# Patient Record
(demographics unavailable — no encounter records)

---

## 2024-10-25 NOTE — DEVELOPMENTAL MILESTONES
[Normal Development] : Normal Development [None] : none [Looks for hidden objects] : looks for hidden objects [Imitates new gestures] : imitates new gestures [Says "Dad" or "Mom" with meaning] : says "Dad" or "Mom" with meaning [Uses one word other than Mom or] : uses one word other than Mom or Dad or personal names [Follows a verbal command that] : follows a verbal command that includes a gesture [Takes first independent] : takes first independent steps [Stands without support] : stands without support [Picks up small object with 2 finger] : picks up small object with 2 finger pincer grasp [Picks up food and eats it] : picks up food and eats it

## 2024-10-25 NOTE — PHYSICAL EXAM
[Alert] : alert [Normocephalic] : normocephalic [Closed Anterior [place]] : closed anterior fontanelle [Red Reflex] : red reflex bilateral [PERRL] : PERRL [Normally Placed Ears] : normally placed ears [Auricles Well Formed] : auricles well formed [Clear Tympanic membranes] : clear tympanic membranes [Light reflex present] : light reflex present [Bony landmarks visible] : bony landmarks visible [Nares Patent] : nares patent [Palate Intact] : palate intact [Uvula Midline] : uvula midline [Tooth Eruption] : tooth eruption [Supple, full passive range of motion] : supple, full passive range of motion [Symmetric Chest Rise] : symmetric chest rise [Clear to Auscultation Bilaterally] : clear to auscultation bilaterally [Regular Rate and Rhythm] : regular rate and rhythm [S1, S2 present] : S1, S2 present [+2 Femoral Pulses] : (+) 2 femoral pulses [Soft] : soft [Bowel Sounds] : normoactive bowel sounds [Normal External Genitalia] : normal external genitalia [Normal Vaginal Introitus] : normal vaginal introitus [No Abnormal Lymph Nodes Palpated] : no abnormal lymph nodes palpated [Symmetric Abduction and Rotation of Hips] : symmetric abduction and rotation of hips [Straight] : straight [Cranial Nerves Grossly Intact] : cranial nerves grossly intact [Discharge] : no discharge [Palpable Masses] : no palpable masses [Murmurs] : no murmurs [Tender] : nontender [Distended] : nondistended [Hepatomegaly] : no hepatomegaly [Splenomegaly] : no splenomegaly [Clitoromegaly] : no clitoromegaly [Allis Sign] : negative Allis sign [Rash or Lesions] : no rash/lesions [de-identified] : very mild plagiocephaly to R side

## 2024-10-25 NOTE — DISCUSSION/SUMMARY
[FreeTextEntry1] : Transition to whole cow's milk. Continue table foods, 3 meals with 2-3 snacks per day. Incorporate up to 6 oz of flourinated water daily in a sippy cup. Brush teeth twice a day with soft toothbrush. Recommend visit to dentist. When in car, keep child in rear-facing car seats until age 2, or until  the maximum height and weight for seat is reached. Put baby to sleep in own crib with no loose or soft bedding. Lower crib matress. Help baby to maintain consistent daily routines and sleep schedule. Recognize stranger and separation anxiety. Ensure home is safe since baby is increasingly mobile. Be within arm's reach of baby at all times. Use consistent, positive discipline. Avoid screen time. Read aloud to baby. MMR, Prevnar and flu vaccines discussed. Mother will hold vaccines today d/t cyanotic episode. Will RTO for vaccines. Labs ordered Discussed breath holding episodes in detail with Mother. Provided reassurance and anticipatory guidance. Recommend f/u with cardiology and neurology, referrals provided. Follow up for 15 month [facility]

## 2024-10-25 NOTE — HISTORY OF PRESENT ILLNESS
[Breast milk] : breast milk [Table food] : table food [Wakes up at night] : Wakes up at night [NO] : No [Mother] : mother [Normal] : Normal [___ stools per day] : [unfilled]  stools per day [Vitamin] : Primary Fluoride Source: Vitamin [Playtime] : Playtime  [No] : Not at  exposure [Water heater temperature set at <120 degrees F] : Water heater temperature set at <120 degrees F [Car seat in back seat] : Car seat in back seat [Smoke Detectors] : Smoke detectors [Carbon Monoxide Detectors] : Carbon monoxide detectors [Up to date] : Up to date [In crib] : In crib [Sippy cup use] : Sippy cup use [Exposure to electronic nicotine delivery system] : No exposure to electronic nicotine delivery system [At risk for exposure to TB] : Not at risk for exposure to Tuberculosis [de-identified] : eating 3 meals daily, nursing on demand through out the day. introduced whole milk but not taking,  [FreeTextEntry3] : waking ON to nurse  [FreeTextEntry1] : Mother reports baby fell off bed about 3 feet high in August while visiting [place]. Pt fell and hit head on tile floor. She reports crying right away but also noticed the baby "turned blue" for a few seconds and seemed to stop breathing. Breathing started spontaneously again after a few seconds. Denies unresponsiveness or LOC. Denies any seizure like activity. Mother reports baby returned to normal behavior shortly after the episode. Did not follow up at doctor or hospital after episode as she was unfamiliar with hospitals in [place].   Reports a few other insistences where she noticed the baby "stops breathing" when she is worked up or crying but no further cyanotic episodes.  Also concerned for flattening to R side of head noticed a few months ago.

## 2024-12-21 NOTE — HISTORY OF PRESENT ILLNESS
[FreeTextEntry6] : Pt BIB mother for c/o congestion, coughing, fever and vomiting x 1 day nursing well with good UOP reports labored and fast breathing over night last night tylenol given at 6 AM

## 2024-12-21 NOTE — DISCUSSION/SUMMARY
[FreeTextEntry1] : Recommend using mist from a humidifier. Allow the child to breathe cool air during the night by opening a window or door. Fever can be treated with an over-the-counter medication such as acetaminophen or ibuprofen. Coughing can be treated with warm, clear fluids to loosen mucus on the vocal cords. Warm water, apple juice, or lemonade is safe for children older than four months. Frozen juice popsicles also can be given. Keep the child's head elevated. If the child's stridor does not improve contact health care provider immediately.  Take oral steroid as prescribed.   In order to maintain hydration consume "oral rehydration solution," such as Pedialyte or low calorie sports drinks. If vomiting, try to give child a few teaspoons of fluid every few minutes. Avoid drinking juice or soda. These can make diarrhea worse. If tolerating solids, its best to consume lean meats, fruits, vegetables, and whole-grain breads and cereals. Avoid eating foods with a lot of fat or sugar, which can make symptoms worse.

## 2025-05-02 NOTE — DISCUSSION/SUMMARY
[Normal Growth] : growth [Normal Development] : development [None] : No known medical problems [No Elimination Concerns] : elimination [No Feeding Concerns] : feeding [No Skin Concerns] : skin [Normal Sleep Pattern] : sleep [Family Support] : family support [Child Development and Behavior] : child development and behavior [Language Promotion/Hearing] : language promotion/hearing [Toliet Training Readiness] : toliet training readiness [Safety] : safety [No Medications] : ~He/She~ is not on any medications [Parent/Guardian] : parent/guardian [] : The components of the vaccine(s) to be administered today are listed in the plan of care. The disease(s) for which the vaccine(s) are intended to prevent and the risks have been discussed with the caretaker.  The risks are also included in the appropriate vaccination information statements which have been provided to the patient's caregiver.  The caregiver has given consent to vaccinate. [FreeTextEntry1] : Anticipatory guidance and parent education given. Continue whole cow's milk. Continue table foods, 3 meals with 2-3 snacks per day.  Importance of well-balanced, healthy diet discussed. Incorporate water daily in a sippy cup. Brush teeth twice a day with soft toothbrush. Recommend visit to dentist. When in car, keep child in rear-facing car seats until age 2, or until the maximum height and weight for seat is reached. Put toddler to sleep in own bed or crib. Help toddler to maintain consistent daily routines and sleep schedule. Toilet training discussed. Recognize anxiety in new settings. Ensure home is safe. Be within arm's reach of toddler at all times. Use consistent, positive discipline. Read aloud to toddler. MMR vaccine given. DTaP, Hib, Varicella, Hepatitis A, PCV-20 vaccines discussed and declined. Follow up in 6 months for PE, sooner for vaccines.

## 2025-05-02 NOTE — HISTORY OF PRESENT ILLNESS
[Mother] : mother [Fruit] : fruit [Vegetables] : vegetables [Meat] : meat [Cereal] : cereal [Eggs] : eggs [Finger Foods] : finger foods [Table food] : table food [Normal] : Normal [Sippy cup use] : Sippy cup use [Brushing teeth] : Brushing teeth [Vitamin] : Primary Fluoride Source: Vitamin [Playtime] : Playtime  [Temper Tantrums] : Temper Tantrums [No] : No cigarette smoke exposure [Water heater temperature set at <120 degrees F] : Water heater temperature set at <120 degrees F [Car seat in back seat] : Car seat in back seat [Carbon Monoxide Detectors] : Carbon monoxide detectors [Smoke Detectors] : Smoke detectors [PCV 20] : PCV 20 [Varicella] : Varicella [DTaP] : DTaP [Hib] : Hib [Hepatitis A] : Hepatitis A [FreeTextEntry7] : Doing well [de-identified] : None [de-identified] : breast milk [FreeTextEntry1] : 18 month old girl here for routine PE. Last PE at 12 months. Pt is behind on vaccines. Doing well. No current concerns. Walks, runs, many words, understands all.  Growth and development wnl.  Good po/uop/bm. Normal sleep and activity.

## 2025-05-02 NOTE — PHYSICAL EXAM
